# Patient Record
Sex: MALE | Race: WHITE | NOT HISPANIC OR LATINO | Employment: FULL TIME | URBAN - METROPOLITAN AREA
[De-identification: names, ages, dates, MRNs, and addresses within clinical notes are randomized per-mention and may not be internally consistent; named-entity substitution may affect disease eponyms.]

---

## 2022-04-12 ENCOUNTER — ALLIED HEALTH/NURSE VISIT (OUTPATIENT)
Dept: FAMILY MEDICINE | Facility: CLINIC | Age: 32
End: 2022-04-12
Payer: COMMERCIAL

## 2022-04-12 VITALS — HEART RATE: 78 BPM | OXYGEN SATURATION: 100 % | DIASTOLIC BLOOD PRESSURE: 86 MMHG | SYSTOLIC BLOOD PRESSURE: 131 MMHG

## 2022-04-12 DIAGNOSIS — Z01.30 BLOOD PRESSURE CHECK: Primary | ICD-10-CM

## 2022-04-12 PROCEDURE — 99207 PR NO CHARGE NURSE ONLY: CPT

## 2022-04-12 NOTE — NURSING NOTE
I met with Francisco Millan at the request of HIMSELF to recheck his blood pressure.  Blood pressure medications on the med list were reviewed with patient.    Patient has taken all medications as per usual regimen: NA patient not taking medication  Patient reports tolerating them without any issues or concerns: NA    Vitals:    04/12/22 1512 04/12/22 1513   BP: (!) 153/91 131/86   BP Location: Right arm    Patient Position: Chair    Cuff Size: Adult Large    Pulse: 78    SpO2: 100%        Blood pressure was taken, previous encounter was reviewed, recorded blood pressure below 140/90.  Patient was discharged and the note will be sent to the provider for final review.     SHAQUILLE Rosales MA